# Patient Record
Sex: FEMALE | Race: WHITE | Employment: OTHER | URBAN - NONMETROPOLITAN AREA
[De-identification: names, ages, dates, MRNs, and addresses within clinical notes are randomized per-mention and may not be internally consistent; named-entity substitution may affect disease eponyms.]

---

## 2024-08-14 ENCOUNTER — OFFICE VISIT (OUTPATIENT)
Dept: FAMILY MEDICINE CLINIC | Age: 74
End: 2024-08-14
Payer: MEDICARE

## 2024-08-14 ENCOUNTER — TELEPHONE (OUTPATIENT)
Dept: FAMILY MEDICINE CLINIC | Age: 74
End: 2024-08-14

## 2024-08-14 VITALS
HEART RATE: 52 BPM | WEIGHT: 110 LBS | BODY MASS INDEX: 18.78 KG/M2 | HEIGHT: 64 IN | DIASTOLIC BLOOD PRESSURE: 74 MMHG | OXYGEN SATURATION: 96 % | SYSTOLIC BLOOD PRESSURE: 138 MMHG

## 2024-08-14 DIAGNOSIS — E78.5 HYPERLIPIDEMIA, UNSPECIFIED HYPERLIPIDEMIA TYPE: ICD-10-CM

## 2024-08-14 DIAGNOSIS — Z91.81 AT HIGH RISK FOR FALLS: ICD-10-CM

## 2024-08-14 DIAGNOSIS — Z00.00 ENCOUNTER FOR PREVENTIVE HEALTH EXAMINATION: ICD-10-CM

## 2024-08-14 DIAGNOSIS — F32.A DEPRESSION, UNSPECIFIED DEPRESSION TYPE: ICD-10-CM

## 2024-08-14 DIAGNOSIS — E55.9 VITAMIN D DEFICIENCY: ICD-10-CM

## 2024-08-14 DIAGNOSIS — G31.09 FRONTAL LOBE DEMENTIA (HCC): ICD-10-CM

## 2024-08-14 DIAGNOSIS — F02.80 FRONTAL LOBE DEMENTIA (HCC): ICD-10-CM

## 2024-08-14 DIAGNOSIS — F41.9 ANXIETY: Primary | ICD-10-CM

## 2024-08-14 DIAGNOSIS — M85.80 OSTEOPENIA, UNSPECIFIED LOCATION: ICD-10-CM

## 2024-08-14 DIAGNOSIS — R32 URINARY INCONTINENCE, UNSPECIFIED TYPE: ICD-10-CM

## 2024-08-14 PROCEDURE — G8400 PT W/DXA NO RESULTS DOC: HCPCS | Performed by: NURSE PRACTITIONER

## 2024-08-14 PROCEDURE — 0509F URINE INCON PLAN DOCD: CPT | Performed by: NURSE PRACTITIONER

## 2024-08-14 PROCEDURE — 99204 OFFICE O/P NEW MOD 45 MIN: CPT | Performed by: NURSE PRACTITIONER

## 2024-08-14 PROCEDURE — 1090F PRES/ABSN URINE INCON ASSESS: CPT | Performed by: NURSE PRACTITIONER

## 2024-08-14 PROCEDURE — 3017F COLORECTAL CA SCREEN DOC REV: CPT | Performed by: NURSE PRACTITIONER

## 2024-08-14 PROCEDURE — 4004F PT TOBACCO SCREEN RCVD TLK: CPT | Performed by: NURSE PRACTITIONER

## 2024-08-14 PROCEDURE — G8427 DOCREV CUR MEDS BY ELIG CLIN: HCPCS | Performed by: NURSE PRACTITIONER

## 2024-08-14 PROCEDURE — G8420 CALC BMI NORM PARAMETERS: HCPCS | Performed by: NURSE PRACTITIONER

## 2024-08-14 PROCEDURE — 1123F ACP DISCUSS/DSCN MKR DOCD: CPT | Performed by: NURSE PRACTITIONER

## 2024-08-14 RX ORDER — DENOSUMAB 60 MG/ML
60 INJECTION SUBCUTANEOUS ONCE
COMMUNITY

## 2024-08-14 RX ORDER — ROSUVASTATIN CALCIUM 10 MG/1
10 TABLET, COATED ORAL DAILY
COMMUNITY
End: 2024-08-14 | Stop reason: SDUPTHER

## 2024-08-14 RX ORDER — ESCITALOPRAM OXALATE 10 MG/1
10 TABLET ORAL DAILY
COMMUNITY

## 2024-08-14 RX ORDER — MULTIVIT-MIN/FA/LYCOPEN/LUTEIN .4-300-25
1 TABLET ORAL DAILY
COMMUNITY

## 2024-08-14 RX ORDER — DONEPEZIL HYDROCHLORIDE 10 MG/1
10 TABLET, FILM COATED ORAL NIGHTLY
COMMUNITY

## 2024-08-14 RX ORDER — ROSUVASTATIN CALCIUM 10 MG/1
10 TABLET, COATED ORAL DAILY
Qty: 90 TABLET | Refills: 1 | Status: SHIPPED | OUTPATIENT
Start: 2024-08-14

## 2024-08-14 RX ORDER — ASCORBIC ACID 1000 MG
TABLET ORAL
COMMUNITY

## 2024-08-14 SDOH — ECONOMIC STABILITY: FOOD INSECURITY: WITHIN THE PAST 12 MONTHS, THE FOOD YOU BOUGHT JUST DIDN'T LAST AND YOU DIDN'T HAVE MONEY TO GET MORE.: NEVER TRUE

## 2024-08-14 SDOH — ECONOMIC STABILITY: FOOD INSECURITY: WITHIN THE PAST 12 MONTHS, YOU WORRIED THAT YOUR FOOD WOULD RUN OUT BEFORE YOU GOT MONEY TO BUY MORE.: NEVER TRUE

## 2024-08-14 SDOH — ECONOMIC STABILITY: INCOME INSECURITY: HOW HARD IS IT FOR YOU TO PAY FOR THE VERY BASICS LIKE FOOD, HOUSING, MEDICAL CARE, AND HEATING?: NOT HARD AT ALL

## 2024-08-14 ASSESSMENT — ENCOUNTER SYMPTOMS
CONSTIPATION: 0
COUGH: 0
CHEST TIGHTNESS: 0
COLOR CHANGE: 0
SHORTNESS OF BREATH: 0
EYE PAIN: 0
BACK PAIN: 0
ABDOMINAL PAIN: 0
DIARRHEA: 0
TROUBLE SWALLOWING: 0

## 2024-08-14 ASSESSMENT — PATIENT HEALTH QUESTIONNAIRE - PHQ9
SUM OF ALL RESPONSES TO PHQ9 QUESTIONS 1 & 2: 1
SUM OF ALL RESPONSES TO PHQ QUESTIONS 1-9: 1
1. LITTLE INTEREST OR PLEASURE IN DOING THINGS: NOT AT ALL
2. FEELING DOWN, DEPRESSED OR HOPELESS: SEVERAL DAYS
SUM OF ALL RESPONSES TO PHQ QUESTIONS 1-9: 1

## 2024-08-14 NOTE — PROGRESS NOTES
No Food Insecurity (8/14/2024)    Hunger Vital Sign     Worried About Running Out of Food in the Last Year: Never true     Ran Out of Food in the Last Year: Never true   Transportation Needs: Unknown (8/14/2024)    PRAPARE - Transportation     Lack of Transportation (Non-Medical): No   Physical Activity: Inactive (7/5/2023)    Received from [facility]    Exercise Vital Sign     Days of Exercise per Week: 0 days     Minutes of Exercise per Session: 0 min   Stress: Patient Declined (7/5/2023)    Received from [facility] - Occupational Stress Questionnaire     Feeling of Stress : Patient declined   Social Connections: Unknown (7/5/2023)    Received from [facility]    Social Connection and Isolation Panel [NHANES]     Active Member of Clubs or Organizations: No     Attends Club or Organization Meetings: Never     Marital Status: Never    Intimate Partner Violence: Not At Risk (7/5/2023)    Received from [facility]    Humiliation, Afraid, Rape, and Kick questionnaire     Fear of Current or Ex-Partner: No     Emotionally Abused: No     Physically Abused: No     Sexually Abused: No   Housing Stability: Unknown (8/14/2024)    Housing Stability Vital Sign     Homeless in the Last Year: No     Current Outpatient Medications on File Prior to Visit   Medication Sig Dispense Refill    escitalopram (LEXAPRO) 10 MG tablet Take 1 tablet by mouth daily      denosumab (PROLIA) 60 MG/ML SOSY SC injection Inject 1 mL into the skin once      donepezil (ARICEPT) 10 MG tablet Take 1 tablet by mouth nightly      vitamin D (CHOLECALCIFEROL) 125 MCG (5000 UT) CAPS capsule Take 1 capsule by mouth daily      Coenzyme Q10 (CO Q 10) 10 MG CAPS Take by mouth      Multiple Vitamins-Minerals (CENTRUM SILVER ADULT 50+) TABS Take 1 tablet by mouth daily       No current facility-administered medications on file prior to visit.     No Known Allergies    Review of Systems

## 2024-08-14 NOTE — TELEPHONE ENCOUNTER
Calling to see when the paper work for benny will be ready to be picked up so that she knows when to come in and pick it up

## 2024-08-14 NOTE — TELEPHONE ENCOUNTER
Spoke to daughter and she states that she will need this next Thursday. Unsure of where this paperwork is, will ask Renetta tomorrow. Daughter states that she dropped it off yesterday.

## 2024-08-16 NOTE — TELEPHONE ENCOUNTER
Daughter calling back to see if she can pick this paper work up.  Let daughter know it will not be ready until late Tuesday afternoon.

## 2024-08-28 ENCOUNTER — TELEPHONE (OUTPATIENT)
Dept: FAMILY MEDICINE CLINIC | Age: 74
End: 2024-08-28

## 2024-09-11 ENCOUNTER — OFFICE VISIT (OUTPATIENT)
Dept: GERIATRIC MEDICINE | Age: 74
End: 2024-09-11
Payer: MEDICARE

## 2024-09-11 DIAGNOSIS — G31.09 FRONTAL LOBE DEMENTIA (HCC): Primary | ICD-10-CM

## 2024-09-11 DIAGNOSIS — F02.80 FRONTAL LOBE DEMENTIA (HCC): Primary | ICD-10-CM

## 2024-09-11 DIAGNOSIS — E55.9 VITAMIN D DEFICIENCY: ICD-10-CM

## 2024-09-11 DIAGNOSIS — N39.42 URINARY INCONTINENCE WITHOUT SENSORY AWARENESS: ICD-10-CM

## 2024-09-11 PROCEDURE — 99344 HOME/RES VST NEW MOD MDM 60: CPT | Performed by: INTERNAL MEDICINE

## 2024-09-11 PROCEDURE — 1123F ACP DISCUSS/DSCN MKR DOCD: CPT | Performed by: INTERNAL MEDICINE

## 2024-10-08 NOTE — PROGRESS NOTES
Patient Name: Razia Cordova  Date: 10/8/2024  YOB: 1950  Medical Record Number: [id number]              History of Present Illness:      Review of Systems    Review of Systems: All 14 review of systems negative other than as stated above    Social History     Tobacco Use    Smokeless tobacco: Never   Substance Use Topics    Alcohol use: Yes     Comment: occasional    Drug use: Defer         Past Medical History:   Diagnosis Date    Anxiety     Depression     Frontal lobe dementia (HCC)     Hyperlipidemia     Osteopenia     Urinary incontinence     Vitamin D deficiency            Past Surgical History:   Procedure Laterality Date    APPENDECTOMY           Current Outpatient Medications on File Prior to Visit   Medication Sig Dispense Refill    escitalopram (LEXAPRO) 10 MG tablet Take 1 tablet by mouth daily Indications: Depression      denosumab (PROLIA) 60 MG/ML SOSY SC injection Inject 1 mL into the skin once      donepezil (ARICEPT) 10 MG tablet Take 1 tablet by mouth nightly Indications: Decline in Cognition due to a Brain Disease      vitamin D (CHOLECALCIFEROL) 125 MCG (5000 UT) CAPS capsule Take 1 capsule by mouth daily Indications: Nutritional Support      Coenzyme Q10 (CO Q 10) 10 MG CAPS Take 10 mg by mouth Daily Indications: Nutritional Support      Multiple Vitamins-Minerals (CENTRUM SILVER ADULT 50+) TABS Take 1 tablet by mouth daily      rosuvastatin (CRESTOR) 10 MG tablet Take 1 tablet by mouth daily (Patient taking differently: Take 1 tablet by mouth nightly Indications: High Amount of Fats in the Blood) 90 tablet 1     No current facility-administered medications on file prior to visit.       No Known Allergies      Family History   Problem Relation Age of Onset    High Blood Pressure Mother     Breast Cancer Mother     Cancer Father         colon cancer    Breast Cancer Sister     Cancer Brother         prostated    High Blood Pressure Brother     Dementia Maternal Aunt     Dementia

## 2025-02-14 DIAGNOSIS — E78.5 HYPERLIPIDEMIA, UNSPECIFIED HYPERLIPIDEMIA TYPE: ICD-10-CM

## 2025-02-14 NOTE — TELEPHONE ENCOUNTER
Comments:     Last Office Visit (last PCP visit):   8/14/2024    Next Visit Date:  No future appointments.    **If hasn't been seen in over a year OR hasn't followed up according to last diabetes/ADHD visit, make appointment for patient before sending refill to provider.    Rx requested:  Requested Prescriptions     Pending Prescriptions Disp Refills    rosuvastatin (CRESTOR) 10 MG tablet [Pharmacy Med Name: ROSUVASTATIN CALCIUM 10 MG TAB] 90 tablet 1     Sig: TAKE 1 TABLET BY MOUTH EVERY DAY

## 2025-02-14 NOTE — TELEPHONE ENCOUNTER
These should be ordered by provider following her in SNF then given that I am not seeing her anymore.

## 2025-02-18 RX ORDER — ROSUVASTATIN CALCIUM 10 MG/1
10 TABLET, COATED ORAL DAILY
Qty: 90 TABLET | Refills: 1 | Status: SHIPPED | OUTPATIENT
Start: 2025-02-18

## 2025-07-15 ENCOUNTER — TELEPHONE (OUTPATIENT)
Dept: FAMILY MEDICINE CLINIC | Age: 75
End: 2025-07-15

## 2025-08-07 ENCOUNTER — TELEPHONE (OUTPATIENT)
Dept: FAMILY MEDICINE CLINIC | Age: 75
End: 2025-08-07

## 2025-10-06 ENCOUNTER — APPOINTMENT (OUTPATIENT)
Dept: NEUROLOGY | Facility: CLINIC | Age: 75
End: 2025-10-06
Payer: MEDICARE